# Patient Record
Sex: MALE | Race: WHITE | Employment: FULL TIME | URBAN - METROPOLITAN AREA
[De-identification: names, ages, dates, MRNs, and addresses within clinical notes are randomized per-mention and may not be internally consistent; named-entity substitution may affect disease eponyms.]

---

## 2020-01-26 ENCOUNTER — HOSPITAL ENCOUNTER (EMERGENCY)
Facility: CLINIC | Age: 50
Discharge: HOME | End: 2020-01-26
Attending: EMERGENCY MEDICINE
Payer: COMMERCIAL

## 2020-01-26 VITALS
RESPIRATION RATE: 17 BRPM | HEIGHT: 73 IN | OXYGEN SATURATION: 96 % | DIASTOLIC BLOOD PRESSURE: 87 MMHG | BODY MASS INDEX: 32.07 KG/M2 | WEIGHT: 242 LBS | SYSTOLIC BLOOD PRESSURE: 147 MMHG | HEART RATE: 88 BPM | TEMPERATURE: 97.1 F

## 2020-01-26 PROCEDURE — 99282 EMERGENCY DEPT VISIT SF MDM: CPT

## 2020-01-26 RX ORDER — LISINOPRIL AND HYDROCHLOROTHIAZIDE 25; 20 MG/1; MG/1
1 TABLET ORAL
COMMUNITY
Start: 2020-01-07

## 2020-01-26 RX ORDER — ROPINIROLE 0.5 MG/1
0.5 TABLET, FILM COATED ORAL
COMMUNITY
Start: 2020-01-07

## 2020-01-26 RX ORDER — AMOXICILLIN AND CLAVULANATE POTASSIUM 875; 125 MG/1; MG/1
1 TABLET, FILM COATED ORAL 2 TIMES DAILY
Qty: 20 TABLET | Refills: 0 | Status: SHIPPED | OUTPATIENT
Start: 2020-01-26 | End: 2020-02-05

## 2020-01-26 RX ORDER — TRIAMCINOLONE ACETONIDE 1 MG/G
CREAM TOPICAL
COMMUNITY
Start: 2020-01-07

## 2020-01-26 RX ORDER — TOBRAMYCIN 3 MG/ML
1 SOLUTION/ DROPS OPHTHALMIC EVERY 4 HOURS
Qty: 1 BOTTLE | Refills: 0 | Status: SHIPPED | OUTPATIENT
Start: 2020-01-26 | End: 2020-02-05

## 2020-01-26 RX ORDER — BUPROPION HYDROCHLORIDE 300 MG/1
300 TABLET ORAL
COMMUNITY
Start: 2020-01-07

## 2020-01-26 RX ORDER — LEVOTHYROXINE SODIUM 0.07 MG/1
75 TABLET ORAL
COMMUNITY
Start: 2020-01-07

## 2020-01-26 NOTE — ED NOTES
Dr Veronica Gay at bedside examining patient's eye with the Desma Manson lamp. Tolerated well.       Blu Felipe RN  01/26/20 9404

## 2020-01-26 NOTE — ED PROVIDER NOTES
ALLERGY) 10 MG CAPS    Take 10 mg by mouth    LEVOTHYROXINE (SYNTHROID) 75 MCG TABLET    Take 75 mcg by mouth    LISINOPRIL-HYDROCHLOROTHIAZIDE (PRINZIDE;ZESTORETIC) 20-25 MG PER TABLET    Take 1 tablet by mouth    ROPINIROLE (REQUIP) 0.5 MG TABLET    Take 0.5 mg by mouth    TRIAMCINOLONE (KENALOG) 0.1 % CREAM    Apply topically       ALLERGIES     is allergic to banana. FAMILY HISTORY     has no family status information on file. family history is not on file. SOCIAL HISTORY      reports that he has quit smoking. He does not have any smokeless tobacco history on file. He reports current alcohol use. He reports that he does not use drugs. PHYSICAL EXAM       ED Triage Vitals [01/26/20 1535]   BP Temp Temp Source Pulse Resp SpO2 Height Weight   (!) 147/87 97.1 °F (36.2 °C) Oral 88 -- 96 % 6' 1\" (1.854 m) 242 lb (109.8 kg)     Constitutional: Alert, oriented x3, nontoxic, answering questions appropriately, acting properly for age, in no acute distress   HEENT: Extraocular muscles intact, mucus membranes moist, right TM clear left TM serous effusion. , no posterior pharyngeal erythema or exudates, Pupils equal, round, reactive to light, erythema of the right conjunctiva with some yellowish drainage at the canaliculi medially. No photophobia. Woods lamp examination no fluorescein dye uptake. Eyelid everted upper and lower no foreign body. Neck: Trachea midline no meningismus. Cardiovascular: Regular rhythm and rate no S3, S4, or murmurs   Respiratory: Clear to auscultation bilaterally no wheezes, rhonchi, rales, no respiratory distress no tachypnea no retractions no hypoxia  Gastrointestinal: Soft, nontender, nondistended, positive bowel sounds. No rebound, rigidity, or guarding.    Musculoskeletal: No extremity pain or swelling   Neurologic: Moving all 4 extremities without difficulty there are no gross focal neurologic deficits   Skin: Warm and dry         DIFFERENTIAL DIAGNOSIS/ MDM:   No pain with

## 2020-01-26 NOTE — ED NOTES
Patient arrives to the ED for complaints of pink eye. States he noticed he had some redness to his R eye several days ago with some crusty drainage. He had some left over eye drops from a previous bout of pink eye and started to use them. He believes that seemed to help. Today he woke up with his R eye crusted shut and some redness to his L eye. Denies any fever. Denies any N/V. He also states he had seen his PCP recently for a sore throat that seemed to started after he noticed the eye drainage. He was given a 2 day course of Prednisone and that seemed to help his throat. Currently sitting on stretcher watching TV, call light in reach.       Queenie Madsen RN  01/26/20 1600